# Patient Record
Sex: FEMALE | Race: WHITE
[De-identification: names, ages, dates, MRNs, and addresses within clinical notes are randomized per-mention and may not be internally consistent; named-entity substitution may affect disease eponyms.]

---

## 2019-08-08 ENCOUNTER — HOSPITAL ENCOUNTER (EMERGENCY)
Dept: HOSPITAL 10 - FTE | Age: 39
LOS: 1 days | Discharge: HOME | End: 2019-08-09
Payer: COMMERCIAL

## 2019-08-08 ENCOUNTER — HOSPITAL ENCOUNTER (EMERGENCY)
Dept: HOSPITAL 91 - FTE | Age: 39
LOS: 1 days | Discharge: HOME | End: 2019-08-09
Payer: COMMERCIAL

## 2019-08-08 VITALS
HEIGHT: 62 IN | BODY MASS INDEX: 30.43 KG/M2 | WEIGHT: 165.35 LBS | BODY MASS INDEX: 30.43 KG/M2 | HEIGHT: 62 IN | WEIGHT: 165.35 LBS

## 2019-08-08 DIAGNOSIS — R07.89: Primary | ICD-10-CM

## 2019-08-08 PROCEDURE — 71045 X-RAY EXAM CHEST 1 VIEW: CPT

## 2019-08-08 PROCEDURE — 99283 EMERGENCY DEPT VISIT LOW MDM: CPT

## 2019-08-09 VITALS — SYSTOLIC BLOOD PRESSURE: 148 MMHG | DIASTOLIC BLOOD PRESSURE: 83 MMHG | RESPIRATION RATE: 18 BRPM | HEART RATE: 53 BPM
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
